# Patient Record
Sex: FEMALE | Race: WHITE | URBAN - METROPOLITAN AREA
[De-identification: names, ages, dates, MRNs, and addresses within clinical notes are randomized per-mention and may not be internally consistent; named-entity substitution may affect disease eponyms.]

---

## 2020-04-30 ENCOUNTER — OFFICE VISIT (OUTPATIENT)
Dept: NEUROLOGY | Age: 65
End: 2020-04-30

## 2020-04-30 VITALS
HEIGHT: 64 IN | RESPIRATION RATE: 16 BRPM | SYSTOLIC BLOOD PRESSURE: 132 MMHG | DIASTOLIC BLOOD PRESSURE: 66 MMHG | HEART RATE: 75 BPM | OXYGEN SATURATION: 97 % | TEMPERATURE: 97.7 F

## 2020-04-30 DIAGNOSIS — G25.81 RLS (RESTLESS LEGS SYNDROME): Primary | ICD-10-CM

## 2020-04-30 NOTE — PATIENT INSTRUCTIONS
Patient history reviewed patient examined. Respect the RLS diagnosis and would like to know where she is been with previous drug trials so I might know what would be legitimate rescue or not. Similarly suggested that she check to when her last iron level was accomplished and maybe it needs to be reordered? Do not want to miss a treatable cause of RLS like iron deficiency anemia. Revisit will be pended until we can get some of the answers to these questions.

## 2020-04-30 NOTE — LETTER
4/30/20 Patient: Tyson Stallings YOB: 1955 Date of Visit: 4/30/2020 LITO Pierre 
[address] [place] [address]. [facility] [id number] VIA Facsimile: [phone] Dear LITO Pierre, Thank you for referring Ms. Tyson Stallings to [facility] for evaluation. My notes for this consultation are attached. If you have questions, please do not hesitate to call me. I look forward to following your patient along with you.  
 
 
Sincerely, 
 
Judith Shah MD

## 2020-04-30 NOTE — PROGRESS NOTES
Restless leg syndrome. Neurology Consult      Subjective:      Anaya Bryant is a 72 y.o. female who comes in today with an established history of restless leg syndrome. This apparently has been in place for the last 8 years. Had stopped the Zoloft as she thought that this was a cause-and-effect relation with RLS and indeed it decrease the intensity of the RLS but she is not found a suitable antidepressant to take its place. More recently tried bupropion but ended up feeling anxious and may be the RLS was plus or minus enhanced? As I understand it she is previously been on Requip and pramipexole but did not seem to help her situation and needed to take higher dosages and apparently developed tolerance or something similar. On top of that she had gravity dependent edema. Describes her experience with RLS as a tingling and uncomfortable sensation in the legs and ends up having episodic leg jerking and when she gets on her feet or moves them the sensation is momentarily relaxed or improved. Ends up taking Xanax every third night for sleep as it is. She had an iron checkup in recent months she thinks but does not remember exactly when. I suggested she check out the date so it may need to be updated again. Has no current concerns for competing issues for sleep such as obstructive sleep apnea or potentially periodic leg movements of sleep. Does not smoke or consume alcohol. Kidney function is normal as I know it. Has apparently been on other medicines to check the RLS and I suggested that be checked out as a goes to gabapentin and similar derivatives and pregabalin. Revisit will be pended for the time being. Current Outpatient Medications   Medication Sig Dispense Refill    alprazolam (XANAX PO) Take  by mouth as needed (anxiety).         Allergies   Allergen Reactions    Other Medication Swelling     RLS medications cause leg swelling     Past Medical History:   Diagnosis Date    Anxiety disorder  Breast cancer ([facility] [address]     CAD (coronary artery disease)     Depression     History of heart attack       Past Surgical History:   Procedure Laterality Date    HX MASTECTOMY  1989      Social History     Socioeconomic History    Marital status:      Spouse name: Not on file    Number of children: Not on file    Years of education: Not on file    Highest education level: Not on file   Occupational History    Not on file   Social Needs    Financial resource strain: Not on file    Food insecurity     Worry: Not on file     Inability: Not on file    Transportation needs     Medical: Not on file     Non-medical: Not on file   Tobacco Use    Smoking status: Never Smoker    Smokeless tobacco: Never Used   Substance and Sexual Activity    Alcohol use: Never     Frequency: Never    Drug use: Never    Sexual activity: Yes   Lifestyle    Physical activity     Days per week: Not on file     Minutes per session: Not on file    Stress: Not on file   Relationships    Social connections     Talks on phone: Not on file     Gets together: Not on file     Attends Rastafari service: Not on file     Active member of club or organization: Not on file     Attends meetings of clubs or organizations: Not on file     Relationship status: Not on file    Intimate partner violence     Fear of current or ex partner: Not on file     Emotionally abused: Not on file     Physically abused: Not on file     Forced sexual activity: Not on file   Other Topics Concern    Not on file   Social History Narrative    Not on file      Family History   Problem Relation Age of Onset    Cancer Mother     Cancer Father       Visit Vitals  /66   Pulse 75   Temp 97.7 °F (36.5 °C) (Oral)   Resp 16   Ht 5' 4\" (1.626 m)   SpO2 97%        Review of Systems:   A comprehensive review of systems was negative except for that written in the HPI. Neuro Exam:     Appearance:   The patient is well developed, well nourished, provides a coherent history and is in no acute distress. Mental Status: Oriented to time, place and person. Mood and affect appropriate. Cranial Nerves:   Intact visual fields. Fundi are benign. CHRISTIANO, EOM's full, no nystagmus, no ptosis. Facial sensation is normal. Corneal reflexes are intact. Facial movement is symmetric. Hearing is normal bilaterally. Palate is midline with normal sternocleidomastoid and trapezius muscles are normal. Tongue is midline. Motor:  5/5 strength in upper and lower proximal and distal muscles. Normal bulk and tone. No fasciculations. Reflexes:   Deep tendon reflexes 1-2+/4 and symmetrical.   Sensory:   Normal to touch, pinprick and vibration. Slightly diminished premature vibratory perception   Gait:  Normal gait. Romberg negative   Tremor:   No tremor noted. Cerebellar:  No cerebellar signs present. Neurovascular:  Normal heart sounds and regular rhythm, peripheral pulses intact, and no carotid bruits. Assessment:   Restless leg syndrome. Patient will check with the treating physician as to what medicine has been tried before so I will know what is a reasonable choice and what is not. She will also check as to when the last time her serum iron level was checked as you do not want to miss iron deficiency anemia. Further suggestions may follow. Plan:   Revisit pended.   Signed by :  Gamal Keating MD